# Patient Record
Sex: FEMALE | Race: WHITE | Employment: FULL TIME | URBAN - METROPOLITAN AREA
[De-identification: names, ages, dates, MRNs, and addresses within clinical notes are randomized per-mention and may not be internally consistent; named-entity substitution may affect disease eponyms.]

---

## 2018-11-30 ENCOUNTER — HOSPITAL ENCOUNTER (OUTPATIENT)
Dept: PHYSICAL THERAPY | Age: 58
Discharge: HOME | End: 2018-11-30
Payer: COMMERCIAL

## 2018-11-30 PROCEDURE — 97161 PT EVAL LOW COMPLEX 20 MIN: CPT | Performed by: PHYSICAL THERAPIST

## 2018-11-30 PROCEDURE — 97112 NEUROMUSCULAR REEDUCATION: CPT | Performed by: PHYSICAL THERAPIST

## 2018-11-30 NOTE — PROGRESS NOTES
[facility] [address], [address] [place] Phone: [phone]  Fax: [phone] Plan of Care/ Statement of Necessity for Physical Therapy Services 2-15 Patient name: Charlene Baptiste  : 1960  Provider#: [id number] Referral source: Referred, Self, MD     
Medical/Treatment Diagnosis: Right hip pain [M25.551] Prior Hospitalization: see medical history Comorbidities: L foot fracture Prior Level of Function: running long distances, full time work in marketing position with [facility] Medications: Verified on Patient Summary List 
 
Start of Care: 2018      Onset Date: > 1 yr ago The Plan of Care and following information is based on the information from the initial evaluation. Assessment/ key information: Corinne Belt presents to our office wtih postural dysfunction, gait deviation, mm weakness, mm hypertonicity and limited biomechanical adaptation pattern variability. She will benefit from skilled PT prior to D/C to address the below and allow return to N ADL skills without pain present. Evaluation Complexity History MEDIUM  Complexity : 1-2 comorbidities / personal factors will impact the outcome/ POC ; Examination HIGH Complexity : 4+ Standardized tests and measures addressing body structure, function, activity limitation and / or participation in recreation  ;Presentation LOW Complexity : Stable, uncomplicated  ;Clinical Decision Making MEDIUM Complexity : FOTO score of 26-74 Overall Complexity Rating: LOW Problem List: pain affecting function, decrease ROM, decrease strength, impaired gait/ balance, decrease ADL/ functional abilitiies, decrease activity tolerance, decrease flexibility/ joint mobility and decrease transfer abilities Treatment Plan may include any combination of the following: Therapeutic exercise, Therapeutic activities, Neuromuscular re-education, Physical agent/modality, Gait/balance training, Manual therapy, Patient education, Functional mobility training and Stair training Patient / Family readiness to learn indicated by: asking questions, trying to perform skills and interest 
Persons(s) to be included in education: patient (P) Barriers to Learning/Limitations: None Patient Goal (s): to have less hip pain and be able to do the [employer] Patient Self Reported Health Status: good Rehabilitation Potential: good Short Term Goals: To be accomplished in 3 treatments: 
1) Patient will demonstrate Negative Hruska Adduction Drop Test  
2) Patient will demonstrate independence with HEP 3) Patient will demonstrate greater than Grade II on Hruska Adduction Lift Test 
 
Long Term Goals: To be accomplished in 8-10 treatments: 
1)Patient will demonstrate Grade IV or Grade V Hruska Adduction Lift Score to allow for functional mobility in home and community settings 2)Pt will demonstrate the ability to ambulate forward and backward achieving bilateral Acetabular Femoral Internal Rotation for pain free mobility 3)Pt will demonstrate the ability to jog without subjective complaints or gait deviation 4)Pt will demonstrate independence with discharge HEP to allow for ambulation, sitting and standing without objective dysfunction Frequency / Duration: Patient to be seen 1 times per week for up to 10 treatments. Patient/ Caregiver education and instruction: self care, activity modification, exercises and other LUBA positioning 
 
[x]  Plan of care has been reviewed with MARCIA Santillan, PT, DPT, [facility] 11/30/2018 9:26 AM 
 
________________________________________________________________________ I certify that the above Therapy Services are being furnished while the patient is under my care. I agree with the treatment plan and certify that this therapy is necessary. [de-identified] Signature:____________________  Date:____________Time: _________

## 2018-11-30 NOTE — PROGRESS NOTES
PT INITIAL EVALUATION NOTE 2-15 Patient Name: Stephanie Pang Date:2018 : 1960 [x]  Patient  Verified Payor: Cabrera Turner / Plan: Denilson Mon HMO / Product Type: HMO /   
In time:9:30 am  Out time:10:30 am 
Total Treatment Time (min): 60 Visit #: 1 Treatment Area: Right hip pain [M25.551] SUBJECTIVE Pain Level (0-10 scale): 2/10 Any medication changes, allergies to medications, adverse drug reactions, diagnosis change, or new procedure performed?: [] No    [x] Yes (see summary sheet for update) Subjective:    
1 year ago was in [place] and was walking/running some hills with resultant R hip pain and R LE radiculopathy. Through the past year she will get pain down her leg with longer distance driving. She cannot run now because is pain down her R LE. She is working out at [employer] with elliptical, weight training and cannot do this without symptoms. 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[employer], exercise at gym, gardening, hiking, running, walking; would like to be able to run the Ailyn Carroll Mechanism of Injury: running and walking in [place] Previous Treatment/Compliance: have tried stretching and rolling; does not have to regularly take OTC meds PMHx/Surgical Hx: L foot fracture 2 years ago and stopped running, got back into working out after getting out of the boot for 4 months; after 4 months got into walking and increased mileage; she did [employer] and walked the entire time with the exception of the last 1.5 miles with a lot pain Work Hx: in and out of cars all day and will go into offices and out of offices; she wears some heels and this does not affect how she feels Living Situation: with  and son; has stairs Pt Goals: would like to run the half marathon Barriers: none noted Motivation: good to excellent Substance use: alcohol FABQ Score: see FOTO scoring Cognition: A & O x 4     
 
 OBJECTIVE/EXAMINATION Postural Restoration [facility]-ER) Evaluation Left   Right Standing Standing Reach Test (M)    4 inches and slowly drops to 2 inches with pn! Functional Squat Test  (P)    Level 2 with v.c.    
 
Sitting FA IR R.O.M. (M)     32 degrees  40 degrees pn! FA ER R.O.M.  (M)     43 degrees  40 degrees FA IR Strength (M)          
FA ER Strength (M) Sidelying Adduction Drop Test (M)    +   + Hruska Adduction Lift Test (M)   0-1   1 Hruska Abduction Lift Test (M) Pelvic Kingfisher Drop Test (PADT) (P) Passive Abduction Raise Test (PART) (P) Passive IP ER Expansion Test (P) Supine Extension Drop Test (M)    -   + Trunk Rotation (M)     15 inches  17 inches Straight Leg Raise (M)     degrees   degrees POSTURAL RESPIRATION EXAMINATION Left   Right Apical Expansion (R)     Limited significantly bialterally   * Horizontal Abduction (R)    100 degrees  120 degrees Shoulder Flexion (R)     Min limited  WNL  
HG IR (R)      70 degrees  54 degrees Subclavius Flexibility (R)    Limited  X  Limited X Elevated and Externally Rotated Ant. Ribs (R) bilaterally CERVICAL-CRANIO-MANDIBULAR EXAM Left   Right Cervical Axial Rotation    60 degrees  80 degrees Horizontal Abduction     Limited to the left Mandibular Opening     30 mm Mandibular Lateral Trusion with Protrusion  Less ROM to L Cervical Extension     flexible 15 min Neuromuscular Re-education:  []  See flow sheet :  
Rationale: increase strength, improve coordination, improve balance and increase proprioception  to improve the patients ability to sit without pain With 
 [] TE 
 [] TA 
 [] neuro 
 [] other: Patient Education: [x] Review HEP [] Progressed/Changed HEP based on:  
[] positioning   [] body mechanics   [] transfers   [] heat/ice application   
[] other:   
 
 
Other Objective/Functional Measures:see FOTO scanned into chart Pain Level (0-10 scale) post treatment: 1/10 ASSESSMENT:  
  
[x]  See Plan of Care Alice Rob PT, DPT, [facility] 11/30/2018  9:26 AM

## 2018-12-03 ENCOUNTER — APPOINTMENT (OUTPATIENT)
Dept: PHYSICAL THERAPY | Age: 58
End: 2018-12-03

## 2018-12-05 ENCOUNTER — HOSPITAL ENCOUNTER (OUTPATIENT)
Dept: PHYSICAL THERAPY | Age: 58
Discharge: HOME | End: 2018-12-05
Payer: COMMERCIAL

## 2018-12-05 PROCEDURE — 97112 NEUROMUSCULAR REEDUCATION: CPT | Performed by: PHYSICAL THERAPIST

## 2018-12-05 NOTE — PROGRESS NOTES
PT DAILY TREATMENT NOTE - [facility] 2-15 Patient Name: Rima Alcantar Date:2018 : 1960 [x]  Patient  Verified Payor: Wendy Click / Plan: Carlita Chaavrria HMO / Product Type: HMO /   
In time:12:30 pm  Out time:1:20 pm 
Total Treatment Time (min): 50 Total Timed Codes (min): 45 
1:1 Treatment Time ( only): 45 Visit #: 2 Treatment Area: Right hip pain [M25.551] SUBJECTIVE Pain Level (0-10 scale): 1/10 Any medication changes, allergies to medications, adverse drug reactions, diagnosis change, or new procedure performed?: [x] No    [] Yes (see summary sheet for update) Subjective functional status/changes:   [] No changes reported Pt reports that she feels so much better and can breathe more easily. OBJECTIVE 45 min Neuromuscular Re-education:  [x]  See flow sheet :  
Rationale: improve coordination, improve balance and increase proprioception  to improve the patients ability to return to N ADL skills With 
 [] TE 
 [] TA [x] neuro 
 [] other: Patient Education: [x] Review HEP [x] Progressed/Changed HEP based on:  
[x] positioning   [x] body mechanics   [] transfers   [] heat/ice application   
[] other:   
 
Other Objective/Functional Measures:  
-HGIR Improved ZOA HaDLT bilaterally 2/5 Pain Level (0-10 scale) post treatment: 0/10 ASSESSMENT/Changes in Function:  
Teri Farooq has demonstrated excellent compliance with HEP and is progressing well with STG. Patient will continue to benefit from skilled PT services to modify and progress therapeutic interventions, address functional mobility deficits, address ROM deficits, address strength deficits, analyze and address soft tissue restrictions, analyze and cue movement patterns, analyze and modify body mechanics/ergonomics, assess and modify postural abnormalities, address imbalance/dizziness and instruct in home and community integration to attain remaining goals. []  See Plan of Care []  See progress note/recertification 
[]  See Discharge Summary Progress towards goals / Updated goals: 
Excellent initial progress following eval and HEP. PLAN 
[]  Upgrade activities as tolerated     [x]  Continue plan of care [x]  Update interventions per flow sheet      
[]  Discharge due to:_ 
[]  Other:_ Vlad Kiran PT, DPT, [facility] 12/5/2018  2:05 PM

## 2018-12-11 ENCOUNTER — HOSPITAL ENCOUNTER (OUTPATIENT)
Dept: PHYSICAL THERAPY | Age: 58
Discharge: HOME | End: 2018-12-11
Payer: COMMERCIAL

## 2018-12-11 PROCEDURE — 97112 NEUROMUSCULAR REEDUCATION: CPT | Performed by: PHYSICAL THERAPIST

## 2018-12-11 NOTE — PROGRESS NOTES
PT DAILY TREATMENT NOTE - [facility] 2-15 Patient Name: Rayo Stanley Date:2018 : 1960 [x]  Patient  Verified Payor: Chiara Ours / Plan: Zoya Roper HMO / Product Type: HMO /   
In time:10:30 pm  Out time:11:20 am 
Total Treatment Time (min): 50 Total Timed Codes (min): 50 
1:1 Treatment Time (MC only): 45 Visit #: 3 Treatment Area: Right hip pain [M25.551] SUBJECTIVE Pain Level (0-10 scale): 0/10 Any medication changes, allergies to medications, adverse drug reactions, diagnosis change, or new procedure performed?: [x] No    [] Yes (see summary sheet for update) Subjective functional status/changes:   [] No changes reported Pt reports that she is not having any pain with her cardio. OBJECTIVE 45 min Neuromuscular Re-education:  [x]  See flow sheet :  
Rationale: improve coordination, improve balance and increase proprioception  to improve the patients ability to return to N ADL skills With 
 [] TE 
 [] TA [x] neuro 
 [] other: Patient Education: [x] Review HEP [x] Progressed/Changed HEP based on:  
[x] positioning   [x] body mechanics   [] transfers   [] heat/ice application   
[] other:   
 
Other Objective/Functional Measures:  
-HGIR Improved ZOA HaDLT bilaterally 3/5 Pain Level (0-10 scale) post treatment: 0/10 ASSESSMENT/Changes in Function:  
Evi Slider has demonstrated excellent compliance with HEP and is progressing well with STG. Patient will continue to benefit from skilled PT services to modify and progress therapeutic interventions, address functional mobility deficits, address ROM deficits, address strength deficits, analyze and address soft tissue restrictions, analyze and cue movement patterns, analyze and modify body mechanics/ergonomics, assess and modify postural abnormalities, address imbalance/dizziness and instruct in home and community integration to attain remaining goals. []  See Plan of Care []  See progress note/recertification 
[]  See Discharge Summary Progress towards goals / Updated goals: 
Excellent initial progress following eval and HEP. PLAN 
[]  Upgrade activities as tolerated     [x]  Continue plan of care [x]  Update interventions per flow sheet      
[]  Discharge due to:_ 
[]  Other:_ Armando Arteaga PT, DPT, [facility] 12/11/2018  2:05 PM

## 2018-12-11 NOTE — PROGRESS NOTES
PT DAILY TREATMENT NOTE - [facility] 2-15    Patient Name: Tyrone King  Date:2018  : 1960  [x]  Patient  Verified  Payor: Ronda Sarabia / Plan: Earlene Bangura HMO / Product Type: HMO /    In time:10:30 pm  Out time:11:20 am  Total Treatment Time (min): 50  Total Timed Codes (min): 50  1:1 Treatment Time ( only): 39   Visit #: 4     Treatment Area: Right hip pain [M25.551]    SUBJECTIVE  Pain Level (0-10 scale): 0/10  Any medication changes, allergies to medications, adverse drug reactions, diagnosis change, or new procedure performed?: [x] No    [] Yes (see summary sheet for update)  Subjective functional status/changes:   [] No changes reported  Pt reports that feels good and is able to do more cardio with less pain that she was before. OBJECTIVE     45 min Neuromuscular Re-education:  [x]  See flow sheet :   Rationale: improve coordination, improve balance and increase proprioception  to improve the patients ability to return to N ADL skills          With   [] TE   [] TA   [x] neuro   [] other: Patient Education: [x] Review HEP    [x] Progressed/Changed HEP based on:   [x] positioning   [x] body mechanics   [] transfers   [] heat/ice application    [] other:      Other Objective/Functional Measures:   -HGIR  Improved ZOA   HaDLT bilaterally 3+/5   L PADT +     Pain Level (0-10 scale) post treatment: 0/10    ASSESSMENT/Changes in Function:   Cele Maria has demonstrated excellent compliance with HEP and is progressing well with STG. Patient will continue to benefit from skilled PT services to modify and progress therapeutic interventions, address functional mobility deficits, address ROM deficits, address strength deficits, analyze and address soft tissue restrictions, analyze and cue movement patterns, analyze and modify body mechanics/ergonomics, assess and modify postural abnormalities, address imbalance/dizziness and instruct in home and community integration to attain remaining goals.      []  See Plan of Care  []  See progress note/recertification  []  See Discharge Summary         Progress towards goals / Updated goals:  Excellent initial progress following eval and HEP. Pt has been compliant with current treatment.     PLAN  []  Upgrade activities as tolerated     [x]  Continue plan of care  [x]  Update interventions per flow sheet       []  Discharge due to:_  []  Other:_      Cris Ford, PT, DPT, [facility] 12/11/2018  2:05 PM

## 2018-12-27 ENCOUNTER — APPOINTMENT (OUTPATIENT)
Dept: PHYSICAL THERAPY | Age: 58
End: 2018-12-27
Payer: COMMERCIAL

## 2019-01-02 ENCOUNTER — HOSPITAL ENCOUNTER (OUTPATIENT)
Dept: PHYSICAL THERAPY | Age: 59
Discharge: HOME | End: 2019-01-02
Payer: COMMERCIAL

## 2019-01-02 PROCEDURE — 97116 GAIT TRAINING THERAPY: CPT | Performed by: PHYSICAL THERAPIST

## 2019-01-02 PROCEDURE — 97112 NEUROMUSCULAR REEDUCATION: CPT | Performed by: PHYSICAL THERAPIST

## 2019-01-02 NOTE — PROGRESS NOTES
PT DAILY TREATMENT NOTE - [facility] 2-15 Patient Name: Fern Candelario Date:2019 : 1960 [x]  Patient  Verified Payor: Horacio Perez / Plan: Pennye Jewel HMO / Product Type: HMO /   
In time:11:00 am  Out time:11:50 am 
Total Treatment Time (min): 50 Total Timed Codes (min): 50 
1:1 Treatment Time ( only): 45 Visit #: 0 Treatment Area: Right hip pain [M25.551] SUBJECTIVE Pain Level (0-10 scale): 1/10 Any medication changes, allergies to medications, adverse drug reactions, diagnosis change, or new procedure performed?: [x] No    [] Yes (see summary sheet for update) Subjective functional status/changes:   [] No changes reported Pt reports that her running is better than when she started but she is still having discomfort. She reports that she is feeling much better overall and is more comfortable when she is sitting. OBJECTIVE 30 min Neuromuscular Re-education:  [x]  See flow sheet :  
Rationale: improve coordination, improve balance and increase proprioception  to improve the patients ability to return to N ADL skills 15 min Gait Training:  [x]  See flow sheet  And scanned HEP gait sheet for instructions. V.c. Given for UE swing, heel off and toe off positioning. Max v.c. Needed today Rationale: improve coordination, improve balance and increase proprioception  to improve the patients ability to return to pain free walking and jogging With 
 [] TE 
 [] TA [x] neuro 
 [] other: Patient Education: [x] Review HEP [x] Progressed/Changed HEP based on:  
[x] positioning   [x] body mechanics   [] transfers   [] heat/ice application   
[] other:   
 
Other Objective/Functional Measures:  
-HGIR Improved ZOA HaDLT bilaterally 3/5 (4/5 following exercise) Bilateral PADT - Pain Level (0-10 scale) post treatment: 0/10 ASSESSMENT/Changes in Function:  
Shaun Barlow has demonstrated excellent compliance with HEP and is progressing well with STG. Patient will continue to benefit from skilled PT services to modify and progress therapeutic interventions, address functional mobility deficits, address ROM deficits, address strength deficits, analyze and address soft tissue restrictions, analyze and cue movement patterns, analyze and modify body mechanics/ergonomics, assess and modify postural abnormalities, address imbalance/dizziness and instruct in home and community integration to attain remaining goals. []  See Plan of Care 
[]  See progress note/recertification 
[]  See Discharge Summary Progress towards goals / Updated goals: 
Excellent progress with min difficulty noted with gait changes. PLAN 
[]  Upgrade activities as tolerated     [x]  Continue plan of care [x]  Update interventions per flow sheet      
[]  Discharge due to:_ 
[]  Other:_ Paul Luong, PT, DPT, [facility] 1/2/2019  2:05 PM

## 2019-01-09 ENCOUNTER — HOSPITAL ENCOUNTER (OUTPATIENT)
Dept: PHYSICAL THERAPY | Age: 59
Discharge: HOME | End: 2019-01-09
Payer: COMMERCIAL

## 2019-01-09 PROCEDURE — 97112 NEUROMUSCULAR REEDUCATION: CPT | Performed by: PHYSICAL THERAPIST

## 2019-01-09 NOTE — PROGRESS NOTES
[facility] [address], [address] [place] Phone: [phone]  Fax: [phone] Progress Note Patient name: Alistair Hoyt  : 1960  Provider#: [id number] Referral source: Referred, Self, MD     
Medical/Treatment Diagnosis: Right hip pain [M25.551] Prior Hospitalization: see medical history Comorbidities: L foot fracture Prior Level of Function: running long distances, full time work in marketing position with [employer] PT Medications: Verified on Patient Summary List 
 
Start of Care: 2018      Visits since start of care: 5 Assessment/ key information: Susu Thomas has demonstrated excellent compliance with HEP. She has been compliant with HEP and is able to walk without pain. She will benefit from skilled PT prior to D/C to address the below. Problem List: pain affecting function, decrease ROM, decrease strength, impaired gait/ balance, decrease ADL/ functional abilitiies, decrease activity tolerance, decrease flexibility/ joint mobility and decrease transfer abilities Treatment Plan may include any combination of the following: Therapeutic exercise, Therapeutic activities, Neuromuscular re-education, Physical agent/modality, Gait/balance training, Manual therapy, Patient education, Functional mobility training and Stair training Short Term Goals: To be accomplished in 3 treatments: all goals have been met 1) Patient will demonstrate Negative Hruska Adduction Drop Test  
2) Patient will demonstrate independence with HEP 3) Patient will demonstrate greater than Grade II on Hruska Adduction Lift Test 
 
Long Term Goals: To be accomplished in 8-10 treatments: progressing toward all 1)Patient will demonstrate Grade IV or Grade V Hruska Adduction Lift Score to allow for functional mobility in home and community settings 2)Pt will demonstrate the ability to ambulate forward and backward achieving bilateral Acetabular Femoral Internal Rotation for pain free mobility 3)Pt will demonstrate the ability to jog without subjective complaints or gait deviation 4)Pt will demonstrate independence with discharge HEP to allow for ambulation, sitting and standing without objective dysfunction Frequency / Duration: Patient to be seen 1 times per week for up to 5 more treatments. Patient/ Caregiver education and instruction: self care, activity modification, exercises and other LUBA positioning 
 
[x]  Plan of care has been reviewed with PTA Tremayne Rubio, PT, DPT, [facility] 1/9/2019 1:33 PM 
 
________________________________________________________________________ I certify that the above Therapy Services are being furnished while the patient is under my care. I agree with the treatment plan and certify that this therapy is necessary. [de-identified] Signature:____________________  Date:____________Time: _________

## 2019-01-09 NOTE — PROGRESS NOTES
PT DAILY TREATMENT NOTE - [facility] 2-15 Patient Name: Nathalia Child Date:2019 : 1960 [x]  Patient  Verified Payor: Jasmin Sue / Plan: Julissa Gray HMO / Product Type: HMO /   
In time:12:35 pm  Out time:12:10 am 
Total Treatment Time (min): 35 Total Timed Codes (min): 35 
1:1 Treatment Time ( only): - Visit #: 1 Treatment Area: Right hip pain [M25.551] SUBJECTIVE Pain Level (0-10 scale): 0/10 Any medication changes, allergies to medications, adverse drug reactions, diagnosis change, or new procedure performed?: [x] No    [] Yes (see summary sheet for update) Subjective functional status/changes:   [] No changes reported Pt reports that she was able to walk 11 miles over the weekend with good tolerance and no noted hip pain present. OBJECTIVE 35 min Neuromuscular Re-education:  [x]  See flow sheet :  
Rationale: improve coordination, improve balance and increase proprioception  to improve the patients ability to return to N ADL skills With 
 [] TE 
 [] TA [x] neuro 
 [] other: Patient Education: [x] Review HEP [x] Progressed/Changed HEP based on:  
[x] positioning   [x] body mechanics   [] transfers   [] heat/ice application   
[] other:   
 
Other Objective/Functional Measures:  
-HGIR Improved ZOA HaDLT bilaterally 4/5 Bilateral PADT - Level 3 squat Pain Level (0-10 scale) post treatment: 0/10 ASSESSMENT/Changes in Function:  
Clara Reason has demonstrated excellent compliance with HEP and is progressing well with STG.  
Patient will continue to benefit from skilled PT services to modify and progress therapeutic interventions, address functional mobility deficits, address ROM deficits, address strength deficits, analyze and address soft tissue restrictions, analyze and cue movement patterns, analyze and modify body mechanics/ergonomics, assess and modify postural abnormalities, address imbalance/dizziness and instruct in home and community integration to attain remaining goals. []  See Plan of Care [x]  See progress note/recertification 
[]  See Discharge Summary Progress towards goals / Updated goals: All STG met today. PLAN 
[]  Upgrade activities as tolerated     [x]  Continue plan of care [x]  Update interventions per flow sheet      
[]  Discharge due to:_ 
[x]  Other: gait assessment by Shelbie Schaumann at next visit Eda Sheikh, PT, DPT, [facility] 1/9/2019  2:05 PM

## 2019-01-17 ENCOUNTER — HOSPITAL ENCOUNTER (OUTPATIENT)
Dept: PHYSICAL THERAPY | Age: 59
End: 2019-01-17
Payer: COMMERCIAL

## 2019-01-24 ENCOUNTER — HOSPITAL ENCOUNTER (OUTPATIENT)
Dept: PHYSICAL THERAPY | Age: 59
Discharge: HOME | End: 2019-01-24
Payer: COMMERCIAL

## 2019-01-24 PROCEDURE — 97116 GAIT TRAINING THERAPY: CPT | Performed by: PHYSICAL THERAPIST

## 2019-01-24 PROCEDURE — 97110 THERAPEUTIC EXERCISES: CPT | Performed by: PHYSICAL THERAPIST

## 2019-01-24 NOTE — PROGRESS NOTES
PT DAILY TREATMENT NOTE - [facility] 2-15 Patient Name: Mukesh Mora Date:2019 : 1960 [x]  Patient  Verified Payor: Debria Gaucher / Plan: Jennifer Kruse HMO / Product Type: HMO /   
In time:9:30 AM  Out time:10:00 AM 
Total Treatment Time (min): 30 Total Timed Codes (min): 30 
1:1 Treatment Time ( only): - Visit #: 6 Treatment Area: Right hip pain [M25.551] SUBJECTIVE Pain Level (0-10 scale): 0/10 Any medication changes, allergies to medications, adverse drug reactions, diagnosis change, or new procedure performed?: [x] No    [] Yes (see summary sheet for update) Subjective functional status/changes:   [] No changes reported Patient returns to the clinic with no significant hip pain or limitations over the past week. She was referred to this therapist for gait analysis to determine readiness to return to running. If she is cleared, her plan is to gradually build up volume to run/walk the [place] in March. OBJECTIVE 10 min Therapeutic exercise: 10 minutes was spent discussing with patient her current home exercise program and how to best implement these exercises into a [id number] program. Education was focused on preventing a re-injury to the right hip as she begins to increase running volume. Rationale: improve coordination, improve balance and increase proprioception  to improve the patients ability to return to running pain free 20 min Gait training: Patient ran at 6.0 mph for 10 minutes to allow for a gait analysis and subsequent training. Training involved verbal cues for both form and sequencing to improve gait efficiency and decrease risk of injury. 10 & 5 minutes were spent on either end of the run to allow for set up for analysis and patient warm-up/cool down Rationale: improve coordination, improve balance and increase proprioception  to improve the patients ability to return to running pain free With 
 [] TE 
 [] TA [x] neuro [] other: Patient Education: [x] Review HEP [x] Progressed/Changed HEP based on:  
[x] positioning   [x] body mechanics   [] transfers   [] heat/ice application   
[] other:   
 
Other Objective/Functional Measures:  
Gait analysis: The following was observed from gait training: L hip/tibia held in 10 degrees of ER relative to right during stance phase, R shoulder lower and protracted during arm swing Midfoot strike with upright posture and forward trunk lean Decreased arm swing on R Pain Level (0-10 scale) post treatment: 0/10 ASSESSMENT/Changes in Function:  
Patient will continue to benefit from skilled PT services to modify and progress therapeutic interventions, address functional mobility deficits, address ROM deficits, address strength deficits, analyze and address soft tissue restrictions, analyze and cue movement patterns, analyze and modify body mechanics/ergonomics, assess and modify postural abnormalities, address imbalance/dizziness and instruct in home and community integration to attain remaining goals. []  See Plan of Care [x]  See progress note/recertification 
[]  See Discharge Summary Progress towards goals / Updated goals: 
Patient will follow-up with Radha Mesa PT, next visit on progress with [facility] program and will slowly return to running over the next two weeks. PLAN 
[]  Upgrade activities as tolerated     [x]  Continue plan of care [x]  Update interventions per flow sheet      
[]  Discharge due to:_ 
[x]  Other:  
 
Rosita Iyer, PT  , DPT, OCS, Cert.  DN 
 1/24/2019  2:05 PM

## 2019-03-15 ENCOUNTER — HOSPITAL ENCOUNTER (OUTPATIENT)
Dept: CT IMAGING | Age: 59
Discharge: HOME | End: 2019-03-15
Payer: SELF-PAY

## 2019-03-15 DIAGNOSIS — Z00.00 PREVENTATIVE HEALTH CARE: ICD-10-CM

## 2019-03-15 PROCEDURE — 75571 CT HRT W/O DYE W/CA TEST: CPT

## 2019-03-18 NOTE — CARDIO/PULMONARY
Reached patient at her given mobile number and shared her coronary artery CT score of zero with her. Discussed the meaning of this score. Also shared that there were additional chest findings  Patient plans to follow up with her PCP, Dr. Thee Cleveland, and asks that we fax a copy to his office - which we will do. Patient has no further questions at this time.

## 2019-03-21 ENCOUNTER — TELEPHONE (OUTPATIENT)
Dept: CARDIAC REHAB | Age: 59
End: 2019-03-21

## 2019-03-21 NOTE — TELEPHONE ENCOUNTER
3/21/2019 [facility]: Faxed Ms. Bridgett Yang heart ct scan results to physician, Dr. Shaina Jhaveri, per Danial John request. Author Tanner